# Patient Record
Sex: FEMALE | Race: WHITE | Employment: UNEMPLOYED | URBAN - METROPOLITAN AREA
[De-identification: names, ages, dates, MRNs, and addresses within clinical notes are randomized per-mention and may not be internally consistent; named-entity substitution may affect disease eponyms.]

---

## 2021-08-18 ENCOUNTER — HOSPITAL ENCOUNTER (EMERGENCY)
Age: 21
Discharge: HOME | End: 2021-08-19
Payer: COMMERCIAL

## 2021-08-18 ENCOUNTER — APPOINTMENT (OUTPATIENT)
Dept: GENERAL RADIOLOGY | Age: 21
End: 2021-08-18
Payer: COMMERCIAL

## 2021-08-18 VITALS
HEIGHT: 60 IN | OXYGEN SATURATION: 97 % | BODY MASS INDEX: 39.13 KG/M2 | TEMPERATURE: 99.2 F | WEIGHT: 199.3 LBS | DIASTOLIC BLOOD PRESSURE: 81 MMHG | RESPIRATION RATE: 14 BRPM | SYSTOLIC BLOOD PRESSURE: 135 MMHG | HEART RATE: 97 BPM

## 2021-08-18 DIAGNOSIS — R79.89 ELEVATED D-DIMER: ICD-10-CM

## 2021-08-18 DIAGNOSIS — M79.89 CALF SWELLING: Primary | ICD-10-CM

## 2021-08-18 LAB
A/G RATIO: 1.3 (ref 1.1–2.2)
ALBUMIN SERPL-MCNC: 3.8 G/DL (ref 3.4–5)
ALP BLD-CCNC: 102 U/L (ref 40–129)
ALT SERPL-CCNC: 10 U/L (ref 10–40)
ANION GAP SERPL CALCULATED.3IONS-SCNC: 14 MMOL/L (ref 3–16)
AST SERPL-CCNC: 12 U/L (ref 15–37)
BASOPHILS ABSOLUTE: 0.1 K/UL (ref 0–0.2)
BASOPHILS RELATIVE PERCENT: 0.6 %
BILIRUB SERPL-MCNC: 0.3 MG/DL (ref 0–1)
BUN BLDV-MCNC: 8 MG/DL (ref 7–20)
CALCIUM SERPL-MCNC: 8.8 MG/DL (ref 8.3–10.6)
CHLORIDE BLD-SCNC: 108 MMOL/L (ref 99–110)
CO2: 22 MMOL/L (ref 21–32)
CREAT SERPL-MCNC: 0.8 MG/DL (ref 0.6–1.1)
D DIMER: 1374 NG/ML DDU (ref 0–229)
EOSINOPHILS ABSOLUTE: 0.2 K/UL (ref 0–0.6)
EOSINOPHILS RELATIVE PERCENT: 2.5 %
GFR AFRICAN AMERICAN: >60
GFR NON-AFRICAN AMERICAN: >60
GLOBULIN: 2.9 G/DL
GLUCOSE BLD-MCNC: 79 MG/DL (ref 70–99)
HCT VFR BLD CALC: 28.5 % (ref 36–48)
HEMOGLOBIN: 9.4 G/DL (ref 12–16)
LYMPHOCYTES ABSOLUTE: 2.4 K/UL (ref 1–5.1)
LYMPHOCYTES RELATIVE PERCENT: 24.6 %
MCH RBC QN AUTO: 28.1 PG (ref 26–34)
MCHC RBC AUTO-ENTMCNC: 33.1 G/DL (ref 31–36)
MCV RBC AUTO: 85 FL (ref 80–100)
MONOCYTES ABSOLUTE: 0.7 K/UL (ref 0–1.3)
MONOCYTES RELATIVE PERCENT: 7.3 %
NEUTROPHILS ABSOLUTE: 6.3 K/UL (ref 1.7–7.7)
NEUTROPHILS RELATIVE PERCENT: 65 %
PDW BLD-RTO: 14.3 % (ref 12.4–15.4)
PLATELET # BLD: 396 K/UL (ref 135–450)
PMV BLD AUTO: 7.1 FL (ref 5–10.5)
POTASSIUM REFLEX MAGNESIUM: 3.7 MMOL/L (ref 3.5–5.1)
RBC # BLD: 3.35 M/UL (ref 4–5.2)
SODIUM BLD-SCNC: 144 MMOL/L (ref 136–145)
TOTAL PROTEIN: 6.7 G/DL (ref 6.4–8.2)
WBC # BLD: 9.8 K/UL (ref 4–11)

## 2021-08-18 PROCEDURE — 36415 COLL VENOUS BLD VENIPUNCTURE: CPT

## 2021-08-18 PROCEDURE — 85379 FIBRIN DEGRADATION QUANT: CPT

## 2021-08-18 PROCEDURE — 99284 EMERGENCY DEPT VISIT MOD MDM: CPT

## 2021-08-18 PROCEDURE — 85025 COMPLETE CBC W/AUTO DIFF WBC: CPT

## 2021-08-18 PROCEDURE — 71046 X-RAY EXAM CHEST 2 VIEWS: CPT

## 2021-08-18 PROCEDURE — 80053 COMPREHEN METABOLIC PANEL: CPT

## 2021-08-18 PROCEDURE — 6370000000 HC RX 637 (ALT 250 FOR IP): Performed by: NURSE PRACTITIONER

## 2021-08-18 RX ADMIN — RIVAROXABAN 15 MG: 15 TABLET, FILM COATED ORAL at 23:56

## 2021-08-19 ENCOUNTER — ANTI-COAG VISIT (OUTPATIENT)
Dept: PHARMACY | Age: 21
End: 2021-08-19
Payer: COMMERCIAL

## 2021-08-19 ENCOUNTER — HOSPITAL ENCOUNTER (OUTPATIENT)
Dept: VASCULAR LAB | Age: 21
Discharge: HOME | End: 2021-08-19
Payer: COMMERCIAL

## 2021-08-19 VITALS — SYSTOLIC BLOOD PRESSURE: 133 MMHG | DIASTOLIC BLOOD PRESSURE: 85 MMHG

## 2021-08-19 DIAGNOSIS — R79.89 ELEVATED D-DIMER: ICD-10-CM

## 2021-08-19 DIAGNOSIS — M79.89 CALF SWELLING: ICD-10-CM

## 2021-08-19 PROCEDURE — 93971 EXTREMITY STUDY: CPT

## 2021-08-19 PROCEDURE — 99211 OFF/OP EST MAY X REQ PHY/QHP: CPT

## 2021-08-19 ASSESSMENT — ENCOUNTER SYMPTOMS
DIARRHEA: 0
COLOR CHANGE: 0
ABDOMINAL PAIN: 0
NAUSEA: 0
SHORTNESS OF BREATH: 0
VOMITING: 0
ABDOMINAL DISTENTION: 0

## 2021-08-19 NOTE — PROGRESS NOTES
Erin Espinoza was recently in the ED with concerns for a DVT. They are here today for a DVT Study to rule out a DVT. Her grandmother accompanies her. This visit was completed as a:  THIS VISIT WAS PERFORMED AS: An in person visit. Protocols were followed with precautions to reduce the spread of COVID-19. The DVT study today was found to be negative. Type of Study:        Veins:Lower Extremities DVT Study, VL EXTREMITY VENOUS DUPLEX LEFT.       Tech Comments       Left   No evidence of deep vein or superficial vein thrombosis involving the left   lower extremity and the right common femoral vein. Tami Sheffield just gave birth 9 days ago. She was given an RX for Xarelto, but has not filled this yet. She reports have a headache since delivery. I checked her blood pressure. Slightly elevated. She reports it normally runs low, was low during delivery and she was given medication to raise it and then it has been running slightly elevated since then. I advised that should her headache become significant that this could be a concern for high blood pressure and she should seek medical care right away. She plans to get her grandmother's BP machine to monitor. She also reports that she has not been eating well. She also reported that her mother had advised her to not drink water due to the calf swelling. Advised and encouraged her to eat well and drink fluids. It was recommended that they follow up with their physician in the next 7 days. She plans to get a new PCP, but she does have an OB/GYN and will f/u with them. They were counseled on signs and symptoms of DVT and if symptoms should worsen to seek medical attention.      [facility]  DVT Program  [phone]      CLINICAL PHARMACY CONSULT: MED RECONCILIATION/REVIEW ADDENDUM    For Pharmacy Admin Tracking Only     Intervention Detail: Dose Adjustment: 1, reason: Therapy De-escalation   Total # of Interventions Recommended: 1   Total # of Interventions Accepted: 1   Time Spent (min): 20

## 2021-08-19 NOTE — ED PROVIDER NOTES
**ADVANCED PRACTICE PROVIDER, I HAVE EVALUATED THIS PATIENT**        [facility] ENCOUNTER      Pt Name: Lindsey PAUL:[id number]  [place] 2000  Date of evaluation: 8/18/2021  Provider: Muriel Lesches, APRN - CNP      Chief Complaint:    Chief Complaint   Patient presents with    Leg Swelling     left leg swelling and a little numb had a baby 8 days ago          Nursing Notes, Past Medical Hx, Past Surgical Hx, Social Hx, Allergies, and Family Hx were all reviewed and agreed with or any disagreements were addressed in the HPI.    HPI: (Location, Duration, Timing, Severity, Quality, Assoc Sx, Context, Modifying factors)    Chief Complaint of left calf pain and swelling    This is a  22 y.o. female who presents to the emergency department today complaining of pain and swelling to the left calf. Onset was earlier this week. The context is that patient is 8 days postpartum, and is worried about a blood clot. No history of blood clots. No skin changes to her leg. No shortness of breath. PastMedical/Surgical History:  History reviewed. No pertinent past medical history. Procedure Laterality Date    FOOT SURGERY         Medications:  Discharge Medication List as of 8/18/2021 11:59 PM            Review of Systems:  (2-9 systems needed)  Review of Systems   Constitutional: Negative for chills, diaphoresis, fatigue and fever. Respiratory: Negative for shortness of breath. Cardiovascular: Negative for chest pain. Gastrointestinal: Negative for abdominal distention, abdominal pain, diarrhea, nausea and vomiting. Musculoskeletal: Positive for joint swelling and myalgias (left calf). Negative for arthralgias and gait problem. Skin: Negative for color change, pallor and rash. Allergic/Immunologic: Negative for immunocompromised state. Neurological: Negative for dizziness, weakness, numbness and headaches. Psychiatric/Behavioral: Negative for confusion. All other systems reviewed and are negative. \"Positives and Pertinent negatives as per HPI\"    Physical Exam:  Physical Exam  Vitals and nursing note reviewed. Constitutional:       General: She is not in acute distress. Appearance: Normal appearance. She is well-developed. She is not toxic-appearing. HENT:      Head: Normocephalic and atraumatic. Eyes:      General: No scleral icterus. Conjunctiva/sclera: Conjunctivae normal.   Neck:      Vascular: No JVD. Cardiovascular:      Rate and Rhythm: Normal rate and regular rhythm. Heart sounds: Normal heart sounds. Pulmonary:      Effort: Pulmonary effort is normal. No respiratory distress. Breath sounds: Normal breath sounds. Abdominal:      Palpations: Abdomen is not rigid. Musculoskeletal:         General: Normal range of motion. Cervical back: Normal range of motion. Left lower leg: Swelling and tenderness present. Skin:     General: Skin is warm and dry. Findings: No rash. Neurological:      General: No focal deficit present. Mental Status: She is alert and oriented to person, place, and time. Cranial Nerves: Cranial nerves are intact. Sensory: Sensation is intact. Motor: Motor function is intact.    Psychiatric:         Mood and Affect: Mood normal.         MEDICAL DECISION MAKING    Vitals:    Vitals:    08/18/21 2003   BP: 135/81   Pulse: 97   Resp: 14   Temp: 99.2 °F (37.3 °C)   TempSrc: Temporal   SpO2: 97%   Weight: 199 lb 4.7 oz (90.4 kg)   Height: 5' (1.524 m)       LABS:  Labs Reviewed   CBC WITH AUTO DIFFERENTIAL - Abnormal; Notable for the following components:       Result Value    RBC 3.35 (*)     Hemoglobin 9.4 (*)     Hematocrit 28.5 (*)     All other components within normal limits    Narrative:     Performed at:  [address] [address]   Phone [phone] COMPREHENSIVE METABOLIC PANEL W/ REFLEX TO MG FOR LOW K - Abnormal; Notable for the following components:    AST 12 (*)     All other components within normal limits    Narrative:     Performed at:  [facility]  [address] [place], [place] [address]   Phone [phone]   D-DIMER, QUANTITATIVE - Abnormal; Notable for the following components:    D-Dimer, Quant 1374 (*)     All other components within normal limits    Narrative:     Performed at:  [facility]  [address] [place], [place] [address]   Phone ([phone] of labs reviewed and were negative at this time or not returned at the time of this note. RADIOLOGY:   Non-plain film images such as CT, Ultrasound and MRI are read by the radiologist. BRENNA Russo CNP have directly visualized the radiologic plain film image(s) with the below findings:      Interpretation per the Radiologist below, if available at the time of this note:    XR CHEST (2 VW)   Final Result   No acute process by radiograph. VL Extremity Venous Left    (Results Pending)        XR CHEST (2 VW)    Result Date: 8/18/2021  EXAMINATION: TWO XRAY VIEWS OF THE CHEST 8/18/2021 8:22 pm COMPARISON: None. HISTORY: ORDERING SYSTEM PROVIDED HISTORY: leg swelling TECHNOLOGIST PROVIDED HISTORY: Reason for exam:->leg swelling Reason for Exam: leg swelling, had a baby 8 days ago, ? blood clot Acuity: Acute Type of Exam: Initial FINDINGS: Cardiac silhouette is upper normal in size. Mediastinal contours are otherwise unremarkable for projection. Lungs demonstrate no focal consolidation or pleural effusion. No pneumothorax appreciated on this projection. No acute process by radiograph.           MEDICAL DECISION MAKING / ED COURSE:      PROCEDURES:   Procedures    None    Patient was given:  Medications   rivaroxaban (XARELTO) tablet 15 mg (15 mg Oral Given 8/18/21 0803) Differential Diagnosis: CHF, hypoproteinemia, renal failure, peripheral vascular disease, systemic infection, cellulitis, compartment syndrome, DVT, other    Patient seen and examined today for left calf pain and swelling. See HPI for patient presentation. Patient is hemodynamically stable, nontoxic, afebrile, and without tachycardia, tachypnea, and hypoxia. Physical exam as above. 76-year-old female lying in bed in no acute distress. Awake alert oriented. Very mild tenderness to the left calf and very mild swelling when compared to the right. There is no skin changes. Muscle soft. Distal extremity pink and well perfused. There is no neurovascular deficits. D-dimer is elevated. CBC and chemistry unremarkable with stable anemia. She is not short of breath, tachycardic, or hypoxic and I have no suspicion for PE. CXR normal.    Emergency department course included Xarelto because Eliquis is contraindicated with breast-feeding. She was given an order to have a Doppler ultrasound done tomorrow and the phone number to call. I advised her to return immediately for any shortness of breath or skin changes to her calf. At this time, the evidence for any other entities in the differential is insufficient to justify any further testing. This was explained to the patient. The patient was advised that persistent or worsening symptoms will require further evaluation. I discussed with Mabel Hsieh and/or family the exam results, diagnosis, care, prognosis, reasons to return and the importance of follow up. Patient and/or family is in full agreement with plan and all questions have been answered. Specific discharge instructions explained, including reasons to return to the emergency department. Mabel Hsieh is well appearing, non-toxic, and afebrile at the time of discharge.  Patient was instructed to follow up with primary care provider in 24-48 hours, and to instructed to return to ED immediately for any new or worsening concerns. Tami Sheffield verbalized understanding and discharged home. The patient tolerated their visit well. I evaluated the patient. The physician was available for consultation as needed. The patient and / or the family were informed of the results of any tests, a time was given to answer questions, a plan was proposed and they agreed with plan. CLINICAL IMPRESSION:  1. Calf swelling    2.  Elevated d-dimer        DISPOSITION Decision To Discharge 08/18/2021 11:17:52 PM      PATIENT REFERRED TO:  Vish Lee  [phone]  Call       [facility]  [address] [place] [address]  [phone]  Go to   As needed      DISCHARGE MEDICATIONS:  Discharge Medication List as of 8/18/2021 11:59 PM      START taking these medications    Details   rivaroxaban (XARELTO) 15 MG TABS tablet Take 1 tablet by mouth 2 times daily (with meals) for 21 days, Disp-42 tablet, R-0Print             DISCONTINUED MEDICATIONS:  Discharge Medication List as of 8/18/2021 11:59 PM                 (Please note the MDM and HPI sections of this note were completed with a voice recognition program.  Efforts were made to edit the dictations but occasionally words are mis-transcribed.)    Electronically signed, BRENNA Reed CNP,           BRENNA Reed CNP  08/19/21 5550

## 2021-08-19 NOTE — ED NOTES
Discharge and education instructions reviewed. Patient verbalized understanding, teach-back successful. Patient denied questions at this time. No acute distress noted. Patient instructed to follow-up as noted - return to emergency department if symptoms worsen. Patient verbalized understanding. Discharged per EDMD with discharged instructions.        Jaymie Anglin RN  08/19/21 0006